# Patient Record
(demographics unavailable — no encounter records)

---

## 2025-07-09 NOTE — HISTORY OF PRESENT ILLNESS
[Y] : Patient reports abnormal menses [Irregular Cycle Intervals] : are  irregular [N] : Patient is not sexually active [FreeTextEntry1] : 19 yo G0 F comes accompanied by her mother to evaluate for PCOS.  She was evaluated in [place] a few years ago and was not clear about diagnosis.  Was started on a "juice" that they think was hormonal contraception which she then discontinued as they thought it would be harmful.  Mother was then treating her with a mixture of ground seeds which she said helped a bit.  Since 12/2023 menses have been irregular and she has skipped up to 5 months.  Sister has similar pattern but mother and MGM do not.  No significant other medical hx.  Has never been sexually active.  [LMPDate] : 6/6/25

## 2025-07-09 NOTE — DISCUSSION/SUMMARY
[FreeTextEntry1] : BMI is 25 No evidence of acne or acanthosis nigricans Patient reports some facial hair which was not in evidence today For PCOS labs today We discussed possible dx of PCOS and they were provided with literature about this from [facility] We discussed possible management options including hormonal contraception RTO x 2 weeks to review labs and discuss management options further.

## 2025-07-23 NOTE — DISCUSSION/SUMMARY
[FreeTextEntry1] : Dx of PCOS made based on [place] Criteria: Patient skips up to 5 months of menses Elevated testosterone and prolactin We discussed dx of PCOS in detail and patient given information on this in Bengali We discussed management with COCs and ely is agreeable to this  Rx Junel 1/20 Fe sent Patient to contact provider with any of the following warning signs of hormonal contraceptives: Abdominal pain CHest pain Headaches Visual Changes Shortness of breath Pain or swelling in limbs  RTO x 3 months for pill check  Patient verbalizes understanding of and agreement with this plan.  All questions answered to patient's satisfaction.

## 2025-07-23 NOTE — HISTORY OF PRESENT ILLNESS
[FreeTextEntry1] : 20 yo presents for follow up of evaluation for PCOS. Hx skipping menses for up to 5 months Previously evaluated in [place] but patient and mother unclear about diagnosis No evidence of acne or acanthosis nigricans Patient reports some facial hair which was not in evidence today BMI is 25.22  PCOS labs 7/9/25: LH, FSH, TSH 17-Hydroxyprogesteonre WNL Prolactin - elevated at 32 Testosterone is 80.1

## 2025-07-23 NOTE — REVIEW OF SYSTEMS
[Patient Intake Form Reviewed] : Patient intake form was reviewed [FreeTextEntry8] : irregular menses